# Patient Record
Sex: MALE | Race: WHITE | URBAN - METROPOLITAN AREA
[De-identification: names, ages, dates, MRNs, and addresses within clinical notes are randomized per-mention and may not be internally consistent; named-entity substitution may affect disease eponyms.]

---

## 2017-08-24 ENCOUNTER — OFFICE VISIT (OUTPATIENT)
Dept: URGENT CARE | Facility: URGENT CARE | Age: 77
End: 2017-08-24
Payer: COMMERCIAL

## 2017-08-24 ENCOUNTER — RADIANT APPOINTMENT (OUTPATIENT)
Dept: GENERAL RADIOLOGY | Facility: CLINIC | Age: 77
End: 2017-08-24
Attending: NURSE PRACTITIONER
Payer: COMMERCIAL

## 2017-08-24 VITALS
SYSTOLIC BLOOD PRESSURE: 122 MMHG | WEIGHT: 184 LBS | DIASTOLIC BLOOD PRESSURE: 78 MMHG | OXYGEN SATURATION: 97 % | TEMPERATURE: 98.1 F | HEART RATE: 48 BPM

## 2017-08-24 DIAGNOSIS — S09.90XA CLOSED HEAD INJURY, INITIAL ENCOUNTER: Primary | ICD-10-CM

## 2017-08-24 DIAGNOSIS — S01.91XA TRAUMATIC HEAD INJURY WITH MULTIPLE LACERATIONS, INITIAL ENCOUNTER: ICD-10-CM

## 2017-08-24 DIAGNOSIS — S99.922A FOOT INJURY, LEFT, INITIAL ENCOUNTER: ICD-10-CM

## 2017-08-24 DIAGNOSIS — S09.90XA TRAUMATIC HEAD INJURY WITH MULTIPLE LACERATIONS, INITIAL ENCOUNTER: ICD-10-CM

## 2017-08-24 DIAGNOSIS — S51.812A SKIN TEAR OF LEFT FOREARM WITHOUT COMPLICATION, INITIAL ENCOUNTER: ICD-10-CM

## 2017-08-24 PROCEDURE — 12004 RPR S/N/AX/GEN/TRK7.6-12.5CM: CPT | Performed by: NURSE PRACTITIONER

## 2017-08-24 PROCEDURE — 73630 X-RAY EXAM OF FOOT: CPT | Mod: LT

## 2017-08-24 PROCEDURE — 99203 OFFICE O/P NEW LOW 30 MIN: CPT | Mod: 25 | Performed by: NURSE PRACTITIONER

## 2017-08-24 NOTE — MR AVS SNAPSHOT
After Visit Summary   8/24/2017    Сергей Nice    MRN: [id number]           Patient Information     Date Of Birth          1940        Visit Information        Provider Department      8/24/2017 7:55 PM Lawrence Krishnamurthy APRN [facility] URGENT CARE        Today's Diagnoses     Closed head injury, initial encounter    -  1    Foot injury, left, initial encounter        Skin tear of left forearm without complication, initial encounter        Traumatic head injury with multiple lacerations, initial encounter          Care Instructions      Scalp Laceration: Sutures or Staples  A laceration is a cut through the skin. A scalp laceration may require stitches (sutures) or staples. There are a lot of blood vessels in the scalp. Because of this, significant bleeding is common with scalp cuts.  Home care  The following guidelines will help you care for your laceration at home:    During the first 2 days you may carefully rinse your hair in the shower to remove blood and glass or dirt particles. After 2 days you may shower and shampoo your hair normally.    Have someone help you clean your wound every day:    In the shower, wash the area with soap and water. Use a wet cotton swab to loosen and remove any blood or crust that forms.    After cleaning, keep the wound clean and dry. Talk with your doctor about applying antibiotic ointment to the wound. Apply a fresh bandage.    Don't put your head underwater until the stitches or staples have been removed. This means no swimming.    Your doctor may prescribe an antibiotic cream or ointment to prevent infection. Do not stop taking this medication until you have finished the prescribed course or your doctor tells you to stop.    Your doctor may prescribe medications for pain. If no pain medicines were prescribed, you can use over-the-counter pain medicines. Follow instructions for taking these medications. Talk with your doctor before using  "these medicines if you have chronic liver or kidney disease. Also talk with your doctor if you have ever had a stomach ulcer or GI bleeding.  Follow-up care  Follow up with your healthcare provider, or as advised. Check the wound daily for the signs of infection listed below. Stitches or staples are usually removed from the scalp in about 7 to 14 days.  Call 911  Call 911 if any of these occur:    Bleeding can't be controlled by direct pressure  When to seek medical advice  Call your healthcare provider right away if any of these occur:    Signs of infection, including increasing pain in the wound, redness, swelling, or pus coming from the wound    Fever of 100.4 F (38 C) or higher, or as directed by your healthcare provider    Stitches or staples come apart or fall out before your next appointment    Wound edges re-open  Date Last Reviewed: 10/1/2016    8134-4601 [employer]. [address] [place] [address]. All rights reserved. This information is not intended as a substitute for professional medical care. Always follow your healthcare professional's instructions.    HEAD INJURY    Although no evidence of any serious injury has been found at this time, close observation for the next 24-48 hours is advised.    Should any of the following occur, contact or return to the clinic or Emergency Department:  1. Repeated or persistent vomiting.    2. Headache which worsens or lasts more than a day.    3. Unequal pupils (one large and one small).     4. Difficulty seeing (double or blurry vision).    5. Dizziness, confusion or loss of consciousness.    6. Increasing drowsiness or inability to arouse the person.  Look for a personality change or irritability (wake the person during the first night every two or three hours).  If the child says \"Quit bugging me, go away\" the child is OK.    7. Bleeding or discharge of fluid from the nose or ears.    8. Slurred speech.    9. New or worsening neck " pain.    10. Seizure/convulsions (uncontrolled movements of the face, arms, and legs).    TREATMENT/SPECIAL INSTRUCTIONS:    Avoid strenuous physical activity for at least 24 hours.   Remain in bed until the headache is gone.    Eat a light diet for 24 hours.    Tylenol for relief of mild pain.    Avoid tranquilizers, sedatives or alcohol for 24 hours after the injury.        Skin Tear (Skin Avulsion)  A skin avulsion is a tearing of the top layer of skin. This commonly happens after a fall or other injury. It also tends to be more common in older people, or those taking blood thinners or steroids for long periods of time.  Home care  These guidelines will help you care for your wound at home:    Keep the wound clean and dry for the first 24 to 48 hours, or as your healthcare provider advises.    If there is a dressing or bandage, change it when it gets wet or dirty. Otherwise, leave it on for the first 24 hours, then change it once a day or as often as the doctor says.    If stitches or staples were used, check the wound every day.    After taking off the dressing, wash the area gently with soap and water. Clean as close to the stitches as you can. Avoid washing or rubbing the stitches directly.    After 3 days you can keep the bandages off the wound, unless told otherwise, or there is continued drainage.  Allow the wound to be open to the air.    Keep a thin layer of antibiotic ointment on the cut. This will keep the wound clean, make it easier to remove the stitches, and reduce scarring.    If your wound is oozing, you can put a nonstick dressing over it. Then, reapply the bandage or dressing as you were told.    You can shower as usual after the first 24 hours, but don't soak the area in water (no baths or swimming) until the stitches or staples are taken out.    If surgical tape was used, keep the area clean and dry. If it becomes wet, blot it dry with a clean towel.    If skin glue was used, don't put any  creams, lotions, or antibiotic ointments on it. These can dissolve the glue. Usually the glue will flake off in about 5 to 10 days by itself. Try to resist picking it off before that so the wound doesn't open up. When it gets wet, pat it dry.  Here is some information about medicine:    You may use over-the-counter medicine such as acetaminophen or ibuprofen to control pain, unless another pain medicine was given. If you have chronic liver or kidney disease or ever had a stomach ulcer or gastrointestinal bleeding, talk with your doctor before using these medicines.    If you were given antibiotics, take them until they are all used up. It is important to finish the antibiotics even if the wound looks better. This will ensure that the infection has cleared.  Follow-up care  Follow up with your healthcare provider, or as advised.    Watch for any signs of infection, such as increasing redness, swelling, or pus coming out. If this happens, don't wait for your scheduled visit. Instead, see a doctor sooner.    Stitches or staples are usually taken out within 5 to 14 days. This varies depending on what part of your body they are on, and the type of wound. The doctor will tell you how long stitches should be left in.     If surgical tape was used, it is usually left on for 7 to 10 days. You can remove surgical tape after that unless you were told otherwise. If you try to remove it, and it is too hard, soaking can help. Surgical tape strips will eventually fall off on their own. If the edges of the cut pull apart, stop removing the tape or strips and follow up with your doctor    As mentioned above, skin glue will flake off by itself in 5 to 10 days, so you don't need to pull it off.  If any X-rays were done, you will be notified of any changes that may affect your care.  When to seek medical advice  Call your healthcare provider right away if any of these occur:    Increasing pain in the wound    Redness, swelling, or pus  "coming from the wound    Fever of 100.4 F (38 C) or higher, or as directed by your healthcare provider    Sutures or staples come apart or fall out before your next appointment and the wound edges look as if they will re-open    Surgical tape closures fall off before 7 days, and the wound edges look as if they will re-open    Bleeding not controlled by direct pressure  Date Last Reviewed: 9/1/2016 2000-2017 [employer]. [address] [place] [address], [place] [address]. All rights reserved. This information is not intended as a substitute for professional medical care. Always follow your healthcare professional's instructions.                Follow-ups after your visit        Who to contact     If you have questions or need follow up information about today's clinic visit or your schedule please contact [facility] URG[facility] directly at [phone].  Normal or non-critical lab and imaging results will be communicated to you by MyChart, letter or phone within 4 business days after the clinic has received the results. If you do not hear from us within 7 days, please contact the clinic through [employer] or phone. If you have a critical or abnormal lab result, we will notify you by phone as soon as possible.  Submit refill requests through [employer] or call your pharmacy and they will forward the refill request to us. Please allow 3 business days for your refill to be completed.          Additional Information About Your Visit        [employer] Information     [employer] lets you send messages to your doctor, view your test results, renew your prescriptions, schedule appointments and more. To sign up, go to [email or web address] . Click on \"Log in\" on the left side of the screen, which will take you to the Welcome page. Then click on \"Sign up Now\" on the right side of the page.     You will be asked to enter the access code listed below, as well as some personal information. Please follow the directions to " create your username and password.     Your access code is: [email or web address]  Expires: 2017  8:57 PM     Your access code will  in 90 days. If you need help or a new code, please call your [facility] clinic or [phone].        Care EveryWhere ID     This is your Care EveryWhere ID. This could be used by other organizations to access your [facility] medical records  [id number]        Your Vitals Were     Pulse Temperature Pulse Oximetry             48 98.1  F (36.7  C) (Oral) 97%          Blood Pressure from Last 3 Encounters:   17 122/78    Weight from Last 3 Encounters:   17 184 lb (83.5 kg)               Primary Care Provider    None Specified       No primary provider on file.        Equal Access to Services     NAVI MARTIN : Brayan voo Sopetra, waaxda luqadaha, qaybta kaalmada adeleiyaromana, medardo gee . So [facility] [phone].    ATENCIÓN: Si habla español, tiene a castillo disposición servicios gratuitos de asistencia lingüística. Llame al [phone].    We comply with applicable federal civil rights laws and [place] laws. We do not discriminate on the basis of race, color, national origin, age, disability sex, sexual orientation or gender identity.            Thank you!     Thank you for choosing [facility]  for your care. Our goal is always to provide you with excellent care. Hearing back from our patients is one way we can continue to improve our services. Please take a few minutes to complete the written survey that you may receive in the mail after your visit with us. Thank you!             Your Updated Medication List - Protect others around you: Learn how to safely use, store and throw away your medicines at www.disposemymeds.org.      Notice  As of 2017  8:57 PM    You have not been prescribed any medications.

## 2017-08-25 ENCOUNTER — TELEPHONE (OUTPATIENT)
Dept: FAMILY MEDICINE | Facility: CLINIC | Age: 77
End: 2017-08-25

## 2017-08-25 NOTE — PATIENT INSTRUCTIONS
Scalp Laceration: Sutures or Staples  A laceration is a cut through the skin. A scalp laceration may require stitches (sutures) or staples. There are a lot of blood vessels in the scalp. Because of this, significant bleeding is common with scalp cuts.  Home care  The following guidelines will help you care for your laceration at home:    During the first 2 days you may carefully rinse your hair in the shower to remove blood and glass or dirt particles. After 2 days you may shower and shampoo your hair normally.    Have someone help you clean your wound every day:    In the shower, wash the area with soap and water. Use a wet cotton swab to loosen and remove any blood or crust that forms.    After cleaning, keep the wound clean and dry. Talk with your doctor about applying antibiotic ointment to the wound. Apply a fresh bandage.    Don't put your head underwater until the stitches or staples have been removed. This means no swimming.    Your doctor may prescribe an antibiotic cream or ointment to prevent infection. Do not stop taking this medication until you have finished the prescribed course or your doctor tells you to stop.    Your doctor may prescribe medications for pain. If no pain medicines were prescribed, you can use over-the-counter pain medicines. Follow instructions for taking these medications. Talk with your doctor before using these medicines if you have chronic liver or kidney disease. Also talk with your doctor if you have ever had a stomach ulcer or GI bleeding.  Follow-up care  Follow up with your healthcare provider, or as advised. Check the wound daily for the signs of infection listed below. Stitches or staples are usually removed from the scalp in about 7 to 14 days.  Call 911  Call 911 if any of these occur:    Bleeding can't be controlled by direct pressure  When to seek medical advice  Call your healthcare provider right away if any of these occur:    Signs of infection, including  "increasing pain in the wound, redness, swelling, or pus coming from the wound    Fever of 100.4 F (38 C) or higher, or as directed by your healthcare provider    Stitches or staples come apart or fall out before your next appointment    Wound edges re-open  Date Last Reviewed: 10/1/2016    8625-3057 [employer]. [address] [place] [address], [place] [address]. All rights reserved. This information is not intended as a substitute for professional medical care. Always follow your healthcare professional's instructions.    HEAD INJURY    Although no evidence of any serious injury has been found at this time, close observation for the next 24-48 hours is advised.    Should any of the following occur, contact or return to the clinic or Emergency Department:  1. Repeated or persistent vomiting.    2. Headache which worsens or lasts more than a day.    3. Unequal pupils (one large and one small).     4. Difficulty seeing (double or blurry vision).    5. Dizziness, confusion or loss of consciousness.    6. Increasing drowsiness or inability to arouse the person.  Look for a personality change or irritability (wake the person during the first night every two or three hours).  If the child says \"Quit bugging me, go away\" the child is OK.    7. Bleeding or discharge of fluid from the nose or ears.    8. Slurred speech.    9. New or worsening neck pain.    10. Seizure/convulsions (uncontrolled movements of the face, arms, and legs).    TREATMENT/SPECIAL INSTRUCTIONS:    Avoid strenuous physical activity for at least 24 hours.   Remain in bed until the headache is gone.    Eat a light diet for 24 hours.    Tylenol for relief of mild pain.    Avoid tranquilizers, sedatives or alcohol for 24 hours after the injury.        Skin Tear (Skin Avulsion)  A skin avulsion is a tearing of the top layer of skin. This commonly happens after a fall or other injury. It also tends to be more common in older people, or those taking blood " thinners or steroids for long periods of time.  Home care  These guidelines will help you care for your wound at home:    Keep the wound clean and dry for the first 24 to 48 hours, or as your healthcare provider advises.    If there is a dressing or bandage, change it when it gets wet or dirty. Otherwise, leave it on for the first 24 hours, then change it once a day or as often as the doctor says.    If stitches or staples were used, check the wound every day.    After taking off the dressing, wash the area gently with soap and water. Clean as close to the stitches as you can. Avoid washing or rubbing the stitches directly.    After 3 days you can keep the bandages off the wound, unless told otherwise, or there is continued drainage.  Allow the wound to be open to the air.    Keep a thin layer of antibiotic ointment on the cut. This will keep the wound clean, make it easier to remove the stitches, and reduce scarring.    If your wound is oozing, you can put a nonstick dressing over it. Then, reapply the bandage or dressing as you were told.    You can shower as usual after the first 24 hours, but don't soak the area in water (no baths or swimming) until the stitches or staples are taken out.    If surgical tape was used, keep the area clean and dry. If it becomes wet, blot it dry with a clean towel.    If skin glue was used, don't put any creams, lotions, or antibiotic ointments on it. These can dissolve the glue. Usually the glue will flake off in about 5 to 10 days by itself. Try to resist picking it off before that so the wound doesn't open up. When it gets wet, pat it dry.  Here is some information about medicine:    You may use over-the-counter medicine such as acetaminophen or ibuprofen to control pain, unless another pain medicine was given. If you have chronic liver or kidney disease or ever had a stomach ulcer or gastrointestinal bleeding, talk with your doctor before using these medicines.    If you were  given antibiotics, take them until they are all used up. It is important to finish the antibiotics even if the wound looks better. This will ensure that the infection has cleared.  Follow-up care  Follow up with your healthcare provider, or as advised.    Watch for any signs of infection, such as increasing redness, swelling, or pus coming out. If this happens, don't wait for your scheduled visit. Instead, see a doctor sooner.    Stitches or staples are usually taken out within 5 to 14 days. This varies depending on what part of your body they are on, and the type of wound. The doctor will tell you how long stitches should be left in.     If surgical tape was used, it is usually left on for 7 to 10 days. You can remove surgical tape after that unless you were told otherwise. If you try to remove it, and it is too hard, soaking can help. Surgical tape strips will eventually fall off on their own. If the edges of the cut pull apart, stop removing the tape or strips and follow up with your doctor    As mentioned above, skin glue will flake off by itself in 5 to 10 days, so you don't need to pull it off.  If any X-rays were done, you will be notified of any changes that may affect your care.  When to seek medical advice  Call your healthcare provider right away if any of these occur:    Increasing pain in the wound    Redness, swelling, or pus coming from the wound    Fever of 100.4 F (38 C) or higher, or as directed by your healthcare provider    Sutures or staples come apart or fall out before your next appointment and the wound edges look as if they will re-open    Surgical tape closures fall off before 7 days, and the wound edges look as if they will re-open    Bleeding not controlled by direct pressure  Date Last Reviewed: 9/1/2016 2000-2017 [employer]. [address] [place] [address], [place] [address]. All rights reserved. This information is not intended as a substitute for professional medical care.  Always follow your healthcare professional's instructions.

## 2017-08-25 NOTE — NURSING NOTE
Chief Complaint   Patient presents with     Urgent Care     leftr arm skin tear      Laceration     back of head     Swelling     top of left foot redness and swelling        Initial /78  Pulse (!) 48  Temp 98.1  F (36.7  C) (Oral)  Wt 184 lb (83.5 kg)  SpO2 97% There is no height or weight on file to calculate BMI.  Medication Reconciliation: complete     Юлия Garcia  CMA

## 2017-08-25 NOTE — PROGRESS NOTES
Chief Complaint   Patient presents with     Urgent Care     leftr arm skin tear      Laceration     back of head     Swelling     top of left foot redness and swelling        SUBJECTIVE:  Сергей Nice is a 76 year old male who presents with multiple family members for evaluation of multiple injuries. They were attempting to transfer a heavy air condenser when it fell and heat patient on the head, left forearm, and left foot. Denying losing consciousness. No headaches. No significant head tenderness. Nose with some oozing lacerations of the scalp. No visual disturbances. No nausea. No vomiting. No gait disturbances. Sustained a complete skin tear of the left middle forearm on the dorsal aspect. Daughter who is a nurse cleaned the wound and applied some dressing. He was wearing some shoes during injury. Very mild discomfort of the left dorsal aspect of the foot. Upon removal of the shoe, they have noted some swelling and rash-like abrasion. On a daily aspirin. No other medications.        OBJECTIVE:  /78  Pulse (!) 48  Temp 98.1  F (36.7  C) (Oral)  Wt 184 lb (83.5 kg)  SpO2 97%   elderly pleasant male in no acute distress. Nontoxic looking. Coherent. Responding appropriately to questioning. Abbreviated screening neurological exam was intact with all. Nose intact. 2 linear lacerations on the scalp more to the right temporal aspect and medial aspect of the scalp. Each approximate to the 4 cm in total length. There was a slight approximately one centimeters more superficial scratch on the inferior aspect adjacent to noted lacerations. No other head trauma. Full range of motion of neck throughout. Good strength testing of the extremities. Bilateral eyes were non injected with pupils equal and reactive to light. Fundi was normal. Bilateral TM were clear. Bilateral external ear canals were clear. The left forearm on the dorsal aspect had an annular area of open skin with the subcutaneous layer having  been lost. No active bleeding. No tenderness. Full range of motion of the bilateral arms throughout. Oral mucosa was moist without any erythema or exudate. No significant cervical adenopathy. Lung sounds were clear to ascultation throughout without any wheezing or rales. No rhonchi. Heart was of regular rhythm and rate. No murmur. Abdomen was soft and nontender, with bowel sounds active throughout. No organomegaly. The left dorsal aspect of the foot had moderate amount of swelling and purplish bruising presentation. The senna had some road rash-like abrasive presentation with some tenderness to palpation. Acceptable range of motion. Good pedal pulses. Good CMS. Contralateral foot was normal.    Foot x-ray did not show any obvious fracture        ASSESSMENT/PLAN:    (S09.90XA) Closed head injury, initial encounter  (primary encounter diagnosis)  Comment: Head injury with stable presentation. Suggested symptomatic management. Monitor very closely for any concerning acute developments and follow-up including emergently as needed.      (S99.922A) Foot injury, left, initial encounter  Comment: Injury probably contusive and abrasive in nature. Suggested symptomatic management for now. Awaiting final radiology reading. Follow-up with persisting concerns.  Plan: XR Foot Left G/E 3 Views            (S51.812A) Skin tear of left forearm without complication, initial encounter  Comment: Dressing was reapplied on skin tear. Will need to heal by secondary intention. Symptomatic management suggested.      (S09.90XA,  S01.91XA) Traumatic head injury with multiple lacerations, initial encounter  Comment: Multiple lacerations requiring suturing repair. Procedure with potential benefits and complications was explained to patient and family members consented. Patient was prepped and draped in a sterile fashion. Anesthesia is with a total of 6 mL of lidocaine with epinephrine. Upon good anesthesia, the wound was cleansed with Hibiclens.  Explored for any foreign object and none was noted. Patient was then prepped and draped in a sterile fashion. Using a sterile technique, a total of 8 staples, 4(four) on each laceration were applied. Good tolerance of procedure. Wound care instructions were provided. Monitor for complications such as infection. Otherwise have staples removed in 5-7 days. Sooner with complications.      JEANINE Beltrán CNP

## 2017-08-25 NOTE — TELEPHONE ENCOUNTER
Wondering if able bear any weight and if he is to have crutches.  Will be coming to get disk of x-ray.  Will print ESTHER for him to sign.  Call back to [phone].  Leidy Smith RN

## 2022-01-11 NOTE — TELEPHONE ENCOUNTER
Crutches as needed. Follow-up as discussed.    Lawrence Krishnamurthy    W Plasty Text: The lesion was extirpated to the level of the fat with a #15 scalpel blade.  Given the location of the defect, shape of the defect and the proximity to free margins a W-plasty was deemed most appropriate for repair.  Using a sterile surgical marker, the appropriate transposition arms of the W-plasty were drawn incorporating the defect and placing the expected incisions within the relaxed skin tension lines where possible.    The area thus outlined was incised deep to adipose tissue with a #15 scalpel blade.  The skin margins were undermined to an appropriate distance in all directions utilizing iris scissors.  The opposing transposition arms were then transposed into place in opposite direction and anchored with interrupted buried subcutaneous sutures.